# Patient Record
Sex: FEMALE | Race: ASIAN | Employment: FULL TIME | URBAN - METROPOLITAN AREA
[De-identification: names, ages, dates, MRNs, and addresses within clinical notes are randomized per-mention and may not be internally consistent; named-entity substitution may affect disease eponyms.]

---

## 2018-04-29 ENCOUNTER — HOSPITAL ENCOUNTER (OUTPATIENT)
Age: 48
Discharge: HOME | End: 2018-04-29
Payer: COMMERCIAL

## 2018-04-29 VITALS
BODY MASS INDEX: 26 KG/M2 | WEIGHT: 150 LBS | RESPIRATION RATE: 18 BRPM | SYSTOLIC BLOOD PRESSURE: 120 MMHG | TEMPERATURE: 100 F | DIASTOLIC BLOOD PRESSURE: 70 MMHG | OXYGEN SATURATION: 98 % | HEART RATE: 110 BPM

## 2018-04-29 DIAGNOSIS — K52.9 GASTROENTERITIS: Primary | ICD-10-CM

## 2018-04-29 PROCEDURE — 99213 OFFICE O/P EST LOW 20 MIN: CPT

## 2018-04-29 PROCEDURE — 99214 OFFICE O/P EST MOD 30 MIN: CPT

## 2018-04-29 RX ORDER — DICYCLOMINE HCL 20 MG
20 TABLET ORAL ONCE
Status: DISCONTINUED | OUTPATIENT
Start: 2018-04-29 | End: 2018-04-29

## 2018-04-29 RX ORDER — ONDANSETRON 4 MG/1
4 TABLET, ORALLY DISINTEGRATING ORAL EVERY 4 HOURS PRN
Qty: 20 TABLET | Refills: 0 | Status: SHIPPED | OUTPATIENT
Start: 2018-04-29 | End: 2018-07-27 | Stop reason: ALTCHOICE

## 2018-04-29 RX ORDER — ONDANSETRON 4 MG/1
4 TABLET, ORALLY DISINTEGRATING ORAL ONCE
Status: COMPLETED | OUTPATIENT
Start: 2018-04-29 | End: 2018-04-29

## 2018-04-29 NOTE — ED INITIAL ASSESSMENT (HPI)
4/29 0100 Pt started with N/V/D, (vomit 5-6X's and diarrhea x3.)  Pt states \"I am feeling better now. \"  Denies fevers. Last meal was Sushi at 2000.

## 2018-04-29 NOTE — ED PROVIDER NOTES
Patient Seen in: [id number] [facility]    History   Patient presents with:  Nausea/Vomiting/Diarrhea (gastrointestinal)    Stated Complaint: ABD PAIN-VOMITING,DIARRHEA    15-year-old female who presents to the immediate care with complaints of negative. Positive for stated complaint: ABD PAIN-VOMITING,DIARRHEA  Other systems are as noted in HPI. Constitutional and vital signs reviewed. All other systems reviewed and negative except as noted above.     Physical Exam   ED Triage Vitals [ Course as of Apr 29 1622  ------------------------------------------------------------       MDM     Patient presents with abdominal pain of nonemergent etiology.   No abdominal bruit, no relation to fatty meals, negative Kwok's, no radiation to back, no

## 2018-07-27 ENCOUNTER — HOSPITAL ENCOUNTER (OUTPATIENT)
Age: 48
Discharge: HOME | End: 2018-07-27
Attending: FAMILY MEDICINE
Payer: COMMERCIAL

## 2018-07-27 VITALS
BODY MASS INDEX: 24.83 KG/M2 | OXYGEN SATURATION: 97 % | TEMPERATURE: 99 F | SYSTOLIC BLOOD PRESSURE: 118 MMHG | HEIGHT: 65 IN | RESPIRATION RATE: 16 BRPM | WEIGHT: 149 LBS | DIASTOLIC BLOOD PRESSURE: 57 MMHG | HEART RATE: 64 BPM

## 2018-07-27 DIAGNOSIS — R10.9 ABDOMINAL CRAMPING: ICD-10-CM

## 2018-07-27 DIAGNOSIS — R35.0 URINARY FREQUENCY: Primary | ICD-10-CM

## 2018-07-27 DIAGNOSIS — N95.1 PERIMENOPAUSAL: ICD-10-CM

## 2018-07-27 LAB
POCT BILIRUBIN URINE: NEGATIVE
POCT BLOOD URINE: NEGATIVE
POCT GLUCOSE URINE: NEGATIVE MG/DL
POCT KETONE URINE: NEGATIVE MG/DL
POCT NITRITE URINE: NEGATIVE
POCT PH URINE: 7 (ref 5–8)
POCT PROTEIN URINE: NEGATIVE MG/DL
POCT SPECIFIC GRAVITY URINE: 1.01
POCT URINE CLARITY: CLEAR
POCT URINE COLOR: YELLOW
POCT UROBILINOGEN URINE: 0.2 MG/DL

## 2018-07-27 PROCEDURE — 81002 URINALYSIS NONAUTO W/O SCOPE: CPT | Performed by: FAMILY MEDICINE

## 2018-07-27 PROCEDURE — 87086 URINE CULTURE/COLONY COUNT: CPT | Performed by: FAMILY MEDICINE

## 2018-07-27 PROCEDURE — 99214 OFFICE O/P EST MOD 30 MIN: CPT

## 2018-07-27 RX ORDER — IBUPROFEN 400 MG/1
400 TABLET ORAL EVERY 6 HOURS PRN
COMMUNITY

## 2018-07-27 RX ORDER — NITROFURANTOIN 25; 75 MG/1; MG/1
100 CAPSULE ORAL 2 TIMES DAILY
Qty: 14 CAPSULE | Refills: 0 | Status: SHIPPED | OUTPATIENT
Start: 2018-07-27 | End: 2018-08-03

## 2018-07-27 NOTE — ED PROVIDER NOTES
Patient Seen [place]: [address] [facility] [place]    History   Patient presents with:  Urinary Symptoms (urologic)  Abdominal Pain    Stated Complaint: ABDOMINAL PAIN    HPI  57-year-old female coming in with complains of mid-central-suprapubic abdominal supple  BACK: No CVA tenderness   LUNGS: CTAB, no RRW  CV: RRR  ABD: suprapubic tenderness+, no HSM, not distended  NEURO: Alert and oriented to person place and time  GAIT: Normal       ED Course     Labs Reviewed   POCT URINALYSIS DIPSTICK - Abnormal; No Prescribed:  Discharge Medication List as of 7/27/2018  5:59 PM    START taking these medications    Nitrofurantoin Monohyd Macro 100 MG Oral Cap  Take 1 capsule (100 mg total) by mouth 2 (two) times daily. , Normal, Disp-14 capsule, R-0

## 2019-02-24 ENCOUNTER — HOSPITAL ENCOUNTER (OUTPATIENT)
Age: 49
Discharge: HOME | End: 2019-02-24
Payer: COMMERCIAL

## 2019-02-24 VITALS
OXYGEN SATURATION: 98 % | HEART RATE: 78 BPM | HEIGHT: 64 IN | BODY MASS INDEX: 25.44 KG/M2 | TEMPERATURE: 100 F | RESPIRATION RATE: 16 BRPM | WEIGHT: 149 LBS | SYSTOLIC BLOOD PRESSURE: 122 MMHG | DIASTOLIC BLOOD PRESSURE: 76 MMHG

## 2019-02-24 DIAGNOSIS — L72.3 SEBACEOUS CYST: Primary | ICD-10-CM

## 2019-02-24 PROCEDURE — 99213 OFFICE O/P EST LOW 20 MIN: CPT

## 2019-02-24 PROCEDURE — 10061 I&D ABSCESS COMP/MULTIPLE: CPT

## 2019-02-24 NOTE — ED PROVIDER NOTES
Patient Seen in: [id number] [facility] [place]    History   Patient presents with:  Abscess (integumentary)    Stated Complaint: bump on the back of her head    HPI    CHIEF COMPLAINT: Bump on scalp    HISTORY OF PRESENT ILLNESS: Pt Is a 24-year-old fe Current:/76   Pulse 78   Temp 99.5 °F (37.5 °C) (Temporal)   Resp 16   Ht 162.6 cm (5' 4\")   Wt 67.6 kg   LMP 12/20/2018   SpO2 98%   BMI 25.58 kg/m²         Physical Exam   Constitutional: She is oriented to person, place, and time.  She appears wel Patient was screened and evaluated during this visit. I determined, within reasonable clinical confidence and prior to discharge, that an emergency medical condition was not or was no longer present.   There was no indication for further evaluation, treat

## 2019-02-26 ENCOUNTER — HOSPITAL ENCOUNTER (OUTPATIENT)
Age: 49
Discharge: HOME | End: 2019-02-26
Payer: COMMERCIAL

## 2019-02-26 VITALS
BODY MASS INDEX: 26 KG/M2 | HEART RATE: 80 BPM | OXYGEN SATURATION: 98 % | RESPIRATION RATE: 16 BRPM | DIASTOLIC BLOOD PRESSURE: 73 MMHG | SYSTOLIC BLOOD PRESSURE: 109 MMHG | TEMPERATURE: 99 F | WEIGHT: 149 LBS

## 2019-02-26 DIAGNOSIS — Z51.89 WOUND CHECK, ABSCESS: Primary | ICD-10-CM

## 2019-02-26 PROCEDURE — 99211 OFF/OP EST MAY X REQ PHY/QHP: CPT

## 2019-02-26 NOTE — ED PROVIDER NOTES
Patient Seen in: [address] [facility] [place]    History   Patient presents with: Follow - Up    Stated Complaint: follow up    46year old female who presents to the [address] [place] [address], for a sebaceous cyst I&D wound check. Patient had a I&D done 2 days ago.   Kasia Ruvalcaba Constitutional: She is oriented to person, place, and time. She appears well-developed and well-nourished. No distress. HENT:   Head: Normocephalic and atraumatic.        Right Ear: External ear normal.   Left Ear: External ear normal.   Mouth/Throat: Juancho Murguia

## 2019-09-16 ENCOUNTER — HOSPITAL ENCOUNTER (OUTPATIENT)
Age: 49
Discharge: HOME | End: 2019-09-16
Attending: FAMILY MEDICINE
Payer: COMMERCIAL

## 2019-09-16 VITALS
HEART RATE: 73 BPM | TEMPERATURE: 100 F | OXYGEN SATURATION: 98 % | SYSTOLIC BLOOD PRESSURE: 116 MMHG | RESPIRATION RATE: 16 BRPM | DIASTOLIC BLOOD PRESSURE: 78 MMHG

## 2019-09-16 DIAGNOSIS — R11.2 NAUSEA AND VOMITING IN ADULT: Primary | ICD-10-CM

## 2019-09-16 LAB
#MXD IC: 0.3 X10ˆ3/UL (ref 0.1–1)
CREAT BLD-MCNC: 0.4 MG/DL (ref 0.55–1.02)
GLUCOSE BLD-MCNC: 87 MG/DL (ref 70–99)
HCT VFR BLD AUTO: 39.5 % (ref 35–48)
HGB BLD-MCNC: 13.6 G/DL (ref 12–16)
ISTAT BUN: 21 MG/DL (ref 8–20)
ISTAT CHLORIDE: 102 MMOL/L (ref 101–111)
ISTAT HEMATOCRIT: 42 % (ref 34–50)
ISTAT IONIZED CALCIUM FOR CHEM 8: 1.14 MMOL/L (ref 1.12–1.32)
ISTAT POTASSIUM: 3.7 MMOL/L (ref 3.6–5.1)
ISTAT SODIUM: 136 MMOL/L (ref 136–145)
LYMPHOCYTES # BLD AUTO: 1 X10ˆ3/UL (ref 1–4)
LYMPHOCYTES NFR BLD AUTO: 14.3 %
MCH RBC QN AUTO: 30 PG (ref 26–34)
MCHC RBC AUTO-ENTMCNC: 34.4 G/DL (ref 31–37)
MCV RBC AUTO: 87.2 FL (ref 80–100)
MIXED CELL %: 4.6 %
NEUTROPHILS # BLD AUTO: 5.7 X10ˆ3/UL (ref 1.5–7.7)
NEUTROPHILS NFR BLD AUTO: 81.1 %
PLATELET # BLD AUTO: 293 X10ˆ3/UL (ref 150–450)
POCT GLUCOSE URINE: NEGATIVE MG/DL
POCT KETONE URINE: 80 MG/DL
POCT NITRITE URINE: NEGATIVE
POCT PH URINE: 5 (ref 5–8)
POCT SPECIFIC GRAVITY URINE: 1.03
POCT URINE CLARITY: CLEAR
POCT URINE COLOR: YELLOW
POCT UROBILINOGEN URINE: 0.2 MG/DL
RBC # BLD AUTO: 4.53 X10ˆ6/UL (ref 3.8–5.3)
WBC # BLD AUTO: 7 X10ˆ3/UL (ref 4–11)

## 2019-09-16 PROCEDURE — 85025 COMPLETE CBC W/AUTO DIFF WBC: CPT | Performed by: FAMILY MEDICINE

## 2019-09-16 PROCEDURE — 87086 URINE CULTURE/COLONY COUNT: CPT | Performed by: FAMILY MEDICINE

## 2019-09-16 PROCEDURE — 99215 OFFICE O/P EST HI 40 MIN: CPT

## 2019-09-16 PROCEDURE — 81002 URINALYSIS NONAUTO W/O SCOPE: CPT | Performed by: FAMILY MEDICINE

## 2019-09-16 PROCEDURE — 99214 OFFICE O/P EST MOD 30 MIN: CPT

## 2019-09-16 PROCEDURE — 80047 BASIC METABLC PNL IONIZED CA: CPT

## 2019-09-16 PROCEDURE — 96374 THER/PROPH/DIAG INJ IV PUSH: CPT

## 2019-09-16 RX ORDER — ONDANSETRON 2 MG/ML
4 INJECTION INTRAMUSCULAR; INTRAVENOUS ONCE
Status: COMPLETED | OUTPATIENT
Start: 2019-09-16 | End: 2019-09-16

## 2019-09-16 RX ORDER — SODIUM CHLORIDE 9 MG/ML
1000 INJECTION, SOLUTION INTRAVENOUS ONCE
Status: COMPLETED | OUTPATIENT
Start: 2019-09-16 | End: 2019-09-16

## 2019-09-16 RX ORDER — ONDANSETRON 4 MG/1
4 TABLET, FILM COATED ORAL EVERY 8 HOURS PRN
COMMUNITY

## 2019-09-16 RX ORDER — ONDANSETRON 8 MG/1
8 TABLET, ORALLY DISINTEGRATING ORAL EVERY 12 HOURS PRN
Qty: 10 TABLET | Refills: 0 | Status: SHIPPED | OUTPATIENT
Start: 2019-09-16 | End: 2019-09-26

## 2019-09-16 NOTE — ED INITIAL ASSESSMENT (HPI)
Pt sts yesterday began with n/v. Approx 7 episodes of vomiting including this morning,. Denies diarrhea, fever or abd pain. Denies urinary symptoms.

## 2019-09-16 NOTE — ED PROVIDER NOTES
Patient Seen in: [id number] [facility] [place]    History   Patient presents with:  Nausea/Vomiting/Diarrhea (gastrointestinal)    Stated Complaint: stomach flu issues    HPI    **56-year-old female presents to the immediate care today with chief skin turgor. Head: Normocephalic and atraumatic   Eyes: PERRLA+, EOMI+. Conjunctiva normal.  Cornea clear. Ears: normal pending membranes. Normal external auditory canals   Nose: Nasal mucosa. No sinus tenderness. No nasal congestion.     Throat: Saba Ang fluid intake  Tylenol for fever.  Avoid NSAIDs due to gastric irritation  May take Prilosec OTC, PeptoBismol/Tums/Rolaids  Avoid Imodium for diarrhea  Monitor for melena, hematochezia or hematemesis  Proceed to ED with worsening abdominal pain, projectile v

## 2023-12-17 ENCOUNTER — HOSPITAL ENCOUNTER (OUTPATIENT)
Age: 53
Discharge: HOME | End: 2023-12-17
Payer: COMMERCIAL

## 2023-12-17 VITALS
TEMPERATURE: 98 F | DIASTOLIC BLOOD PRESSURE: 80 MMHG | SYSTOLIC BLOOD PRESSURE: 119 MMHG | OXYGEN SATURATION: 98 % | WEIGHT: 154 LBS | HEART RATE: 94 BPM | HEIGHT: 64 IN | BODY MASS INDEX: 26.29 KG/M2 | RESPIRATION RATE: 16 BRPM

## 2023-12-17 DIAGNOSIS — J02.9 ACUTE VIRAL PHARYNGITIS: Primary | ICD-10-CM

## 2023-12-17 LAB — S PYO AG THROAT QL: NEGATIVE
